# Patient Record
Sex: FEMALE | Race: WHITE | URBAN - METROPOLITAN AREA
[De-identification: names, ages, dates, MRNs, and addresses within clinical notes are randomized per-mention and may not be internally consistent; named-entity substitution may affect disease eponyms.]

---

## 2021-05-30 ENCOUNTER — RECORDS - HEALTHEAST (OUTPATIENT)
Dept: ADMINISTRATIVE | Facility: CLINIC | Age: 46
End: 2021-05-30

## 2025-05-28 NOTE — PATIENT INSTRUCTIONS
If you have labs or imaging done, the results will automatically release in [employer] without an interpretation.  Your health care professional will review those results and send an interpretation with recommendations as soon as possible, but this may be 1-3 business days.    If you have any questions regarding your visit, please contact your care team.     [employer]: [phone]  [facility] CLINIC HOURS TELEPHONE NUMBER   Morena Xavier, LUCY Paige-RHETT Weeks-RHETT Arreola-Surgery Scheduler  Melissa-       Monday- [place]  8:00 am-4:00 pm    Tuesday- [place]  8:00 am-4:00 pm    Wednesday- [place] 8:00 am-4:00 pm    Thursday- [place] 8:00 am-4:00 pm    Friday- [place]  8:00 am-4:00 pm [facility]  [address] [place]  [place] [address]  PH: [phone]  Fax: [phone]    Imaging Scheduling all locations  PH: [phone]     [facility]  [address] [address] [address].  [place], [place] [address] [phone]    [facility]  [address] [place] [address]  PH: [phone]     **Surgeries** Our Surgery Schedulers will contact you to schedule. If you do not receive a call within 3 business days, please call [phone].  Urgent Care locations:  [facility]       Monday-Friday   10 am - 8 pm    Saturday and Sunday   9 am - 5 pm   [phone]   If you need a medication refill, please contact your pharmacy. Please allow 3 business days for your refill to be completed.  As always, Thank you for trusting us with your healthcare needs!

## 2025-05-29 ENCOUNTER — OFFICE VISIT (OUTPATIENT)
Dept: OBGYN | Facility: CLINIC | Age: 50
End: 2025-05-29
Payer: COMMERCIAL

## 2025-05-29 ENCOUNTER — RESULTS FOLLOW-UP (OUTPATIENT)
Dept: OBGYN | Facility: CLINIC | Age: 50
End: 2025-05-29

## 2025-05-29 VITALS
BODY MASS INDEX: 23.74 KG/M2 | HEART RATE: 80 BPM | SYSTOLIC BLOOD PRESSURE: 112 MMHG | DIASTOLIC BLOOD PRESSURE: 71 MMHG | WEIGHT: 134 LBS | OXYGEN SATURATION: 100 % | HEIGHT: 63 IN

## 2025-05-29 DIAGNOSIS — Z12.4 CERVICAL CANCER SCREENING: ICD-10-CM

## 2025-05-29 DIAGNOSIS — Z13.1 SCREENING FOR DIABETES MELLITUS: ICD-10-CM

## 2025-05-29 DIAGNOSIS — Z01.419 WELL WOMAN EXAM WITH ROUTINE GYNECOLOGICAL EXAM: Primary | ICD-10-CM

## 2025-05-29 DIAGNOSIS — N95.1 PERIMENOPAUSE: ICD-10-CM

## 2025-05-29 DIAGNOSIS — Z13.220 LIPID SCREENING: ICD-10-CM

## 2025-05-29 DIAGNOSIS — Z12.11 SCREEN FOR COLON CANCER: ICD-10-CM

## 2025-05-29 DIAGNOSIS — Z12.31 VISIT FOR SCREENING MAMMOGRAM: ICD-10-CM

## 2025-05-29 LAB
CHOLEST SERPL-MCNC: 237 MG/DL
EST. AVERAGE GLUCOSE BLD GHB EST-MCNC: 97 MG/DL
FASTING STATUS PATIENT QL REPORTED: YES
HBA1C MFR BLD: 5 % (ref 0–5.6)
HDLC SERPL-MCNC: 72 MG/DL
HPV HR 12 DNA CVX QL NAA+PROBE: NEGATIVE
HPV16 DNA CVX QL NAA+PROBE: NEGATIVE
HPV18 DNA CVX QL NAA+PROBE: NEGATIVE
HUMAN PAPILLOMA VIRUS FINAL DIAGNOSIS: NORMAL
LDLC SERPL CALC-MCNC: 151 MG/DL
NONHDLC SERPL-MCNC: 165 MG/DL
TRIGL SERPL-MCNC: 71 MG/DL

## 2025-05-29 RX ORDER — PROGESTERONE 200 MG/1
200 CAPSULE ORAL DAILY
Qty: 15 CAPSULE | Refills: 0 | Status: SHIPPED | OUTPATIENT
Start: 2025-05-29

## 2025-05-29 NOTE — PROGRESS NOTES
Well Woman Exam Note    SUBJECTIVE:  Mackenzie Lindsey is a 49 year old female  who presents today for her well woman exam.    Concerns today include:     Delaying period: she is due for her menses in the next week and is going to [place] this Saturday. She is wanting progesterone to delay menses until she returns.    Menstrual History: reports her cycles are becoming closer together and she suspects she is perimenopausal      2025     7:30 AM   Menstrual History   LAST MENSTRUAL PERIOD 2025     Sexual history: Monogamous. Declines STI testing.    Diet/Exercise: Stable    Mental Health: Stable    She has no bowel/bladder concerns today    Last pap: 3/29/2016 HPV neg  History of abnormal Pap smear: No - age 30- 64 PAP with HPV every 5 years recommended  Pap obtained today:  Yes    Mammogram current: no (patient will schedule)  Last Mammogram: 2018 Cat 1: Neg    Lipids: Check today    Diabetes Screening: Check today    Colonoscopy: Ordered  (Recommended screening begins at age 45 years old)    Lung cancer screening: N/A  (Recommended yearly for people who: have a 20 pack-year or more smoking history AND smoke now or have quit within the past 15 years AND are between 50-80 years old)    DEXA scan: N/A  (Recommended screening begins at age 65 years old)    HISTORY:  Prescription Medications as of 2025         Rx Number Disp Refills Start End Last Dispensed Date Next Fill Date Owning Pharmacy    progesterone (PROMETRIUM) 200 MG capsule  15 capsule 0 2025 --   [facility] [address] [place] [facility] [address]    Sig: Take 1 capsule (200 mg) by mouth daily.    Class: E-Prescribe    Route: Oral          No Known Allergies  Immunization History   Administered Date(s) Administered    Influenza, Split Virus, Trivalent, Pf (Fluzone\Fluarix) 10/22/2007, 2008    TDAP (Adacel,Boostrix) 2008, 2008       OB History    Para Term  AB Living   5 4 3 1 1 4   SAB IAB  "Ectopic Multiple Live Births   1 0 0 0 4      # Outcome Date GA Lbr Jamal/2nd Weight Sex Type Anes PTL Lv   5 Term     F Vag-Spont   CHRISTINE   4 Term     F Vag-Spont   CHRISTINE   3      M Vag-Spont   CHRISTINE   2 Term     M Vag-Spont   CHRISTINE   1 SAB               History reviewed. No pertinent past medical history.  History reviewed. No pertinent surgical history.  History reviewed. No pertinent family history.  Social History     Socioeconomic History    Marital status:      Spouse name: None    Number of children: None    Years of education: None    Highest education level: None   Tobacco Use    Smoking status: Never     Passive exposure: Never    Smokeless tobacco: Never   Vaping Use    Vaping status: Never Used   Substance and Sexual Activity    Alcohol use: Yes     Alcohol/week: 1.0 - 2.0 standard drink of alcohol     Types: 1 - 2 Drinks containing 0.5 oz of alcohol per week    Drug use: Not Currently    Sexual activity: Yes     Partners: Male     Birth control/protection: Male Surgical       REVIEW of SYSTEMS:  ROS: 10 point ROS neg other than the symptoms noted above in the HPI.     EXAM:  Blood pressure 112/71, pulse 80, height 1.607 m (5' 3.25\"), weight 60.8 kg (134 lb), last menstrual period 2025, SpO2 100%. Body mass index is 23.55 kg/m .  General - pleasant female in no acute distress.  Skin - no suspicious lesions or rashes  EENT-  PERRLA, euthyroid with out palpable nodules  Neck - supple without lymphadenopathy.  Lungs - clear to auscultation bilaterally.  Heart - regular rate and rhythm without murmur.  Abdomen - soft, nontender, nondistended, no masses or organomegaly noted.  Musculoskeletal - no gross deformities.  Neurological - normal strength, sensation, and mental status.    Breast Exam:  Breasts: normal without suspicious masses, skin changes or axillary nodes, symmetric fibrous changes in both upper outer quadrants, self exam is taught and encouraged.     Pelvic Exam:  EG/BUS: Normal " genital architecture without lesions, erythema or abnormal secretions Bartholin's, Urethra, [place]'s luis  Urethral meatus: normal   Urethra: no masses, tenderness, or scarring   Bladder: no masses or tenderness   Vagina: moist, pink, rugae with creamy, white, and odorless  secretions  Cervix: Multiparous,, no lesions, and pink, moist, closed, without lesion or CMT  Uterus: anteverted,  and small, smooth, firm, mobile w/o pain  Adnexa: Within normal limits and No masses, nodularity, tenderness  Rectum:anus normal     ASSESSMENT/PLAN:  (Z01.419) Well woman exam with routine gynecological exam  (primary encounter diagnosis)  Comment: Additional teaching done at this visit included: self breast exam, birth control, perimenopause, menopause, colonoscopy, mammography, and lipids; Discussed with patient risks/ benefits and treatment options of prescribed medications or other treatment modalities; RTC in one year for annual exam or with concerns.   Plan: MA Screening Bilateral w/ Abrahan, HPV and         Gynecologic Cytology Panel - Recommended Age 30        - 65 Years, progesterone (PROMETRIUM) 200 MG         capsule, Lipid panel reflex to direct LDL         Fasting, Hemoglobin A1c          (Z12.31) Visit for screening mammogram  Plan: MA Screening Bilateral w/ Abrahan          (Z12.11) Screen for colon cancer  Plan: Colonoscopy Screening  Referral          (Z12.4) Cervical cancer screening  Plan: HPV and Gynecologic Cytology Panel -         Recommended Age 30 - 65 Years          (Z13.1) Screening for diabetes mellitus  Plan: Hemoglobin A1c          (Z13.220) Lipid screening  Plan: Lipid panel reflex to direct LDL Fasting    (N95.1) Perimenopause  Comment: We discussed that she can take progesterone for duration of her trip and that she should start bleeding once she stops course. We also spent time discussing normal bleeding patterns and when she should reach out for further evaluation - such as any prolonged or  intermenstrual bleeding, periods coming more frequently than every 3 weeks, or bleeding more than 1 week.  Plan: progesterone (PROMETRIUM) 200 MG capsule                KEHINDE Reddy CNP

## 2025-06-03 LAB
BKR AP ASSOCIATED HPV REPORT: NORMAL
BKR LAB AP GYN ADEQUACY: NORMAL
BKR LAB AP GYN INTERPRETATION: NORMAL
BKR LAB AP PREVIOUS ABNORMAL: NORMAL
PATH REPORT.COMMENTS IMP SPEC: NORMAL
PATH REPORT.COMMENTS IMP SPEC: NORMAL
PATH REPORT.RELEVANT HX SPEC: NORMAL

## 2025-08-09 ENCOUNTER — HEALTH MAINTENANCE LETTER (OUTPATIENT)
Age: 50
End: 2025-08-09